# Patient Record
Sex: MALE | Race: WHITE
[De-identification: names, ages, dates, MRNs, and addresses within clinical notes are randomized per-mention and may not be internally consistent; named-entity substitution may affect disease eponyms.]

---

## 2018-10-15 ENCOUNTER — HOSPITAL ENCOUNTER (OUTPATIENT)
Dept: HOSPITAL 75 - CARD | Age: 68
End: 2018-10-15
Attending: NURSE PRACTITIONER
Payer: MEDICARE

## 2018-10-15 DIAGNOSIS — I08.1: ICD-10-CM

## 2018-10-15 DIAGNOSIS — I25.10: Primary | ICD-10-CM

## 2018-10-15 PROCEDURE — 93306 TTE W/DOPPLER COMPLETE: CPT

## 2018-10-19 ENCOUNTER — HOSPITAL ENCOUNTER (OUTPATIENT)
Dept: HOSPITAL 75 - CARD | Age: 68
End: 2018-10-19
Attending: NURSE PRACTITIONER
Payer: MEDICARE

## 2018-10-19 VITALS — DIASTOLIC BLOOD PRESSURE: 81 MMHG | SYSTOLIC BLOOD PRESSURE: 168 MMHG

## 2018-10-19 DIAGNOSIS — I25.10: Primary | ICD-10-CM

## 2018-10-19 PROCEDURE — 93017 CV STRESS TEST TRACING ONLY: CPT

## 2018-10-19 PROCEDURE — 78452 HT MUSCLE IMAGE SPECT MULT: CPT

## 2018-10-20 NOTE — STRESS TEST
DATE OF SERVICE:  10/19/2018



RESTING AND POST EXERCISE TECHNETIUM-99M TETROFOSMIN SPECT CT IMAGING



ORDERING PHYSICIAN:

JN Garzon



PRIMARY CARE PHYSICIAN:

Dr. Dsouza.



OTHER PHYSICIAN:

Dr. Wise.



CLINICAL DIAGNOSES:

Coronary artery disease.



Baseline images were carried out after injection of 10.94 mCi of technetium-99m

Tetrofosmin.  This was followed by exercise on a treadmill.  Tej protocol was

employed.  He completed two stages and exercised for 2 minutes in the third

stage.  The total exercise time was 8 minutes.  Exercise was stopped on account

of fatigue.  He attained 90% of maximum predicted heart rate.  There did not

appear to be significant ST segment changes consistent with ischemia.  No

significant arrhythmia was seen during exercise.  Heart rate response to

exercise was normal.  Blood pressure response to exercise was somewhat

hypertensive.  In the recovery phase, a few isolated and one coupled premature

ventricular contractions were seen.  He received 32.8 mCi of technetium-99m

Tetrofosmin for stress imaging after he had attained more than 85% of maximum

predicted heart rate and the exercise was continued for more than another

minute.



Review of images at rest and following stress does not indicate any significant

perfusion defects consistent with significant myocardial ischemia or infarction.

 Gated images show normal global left ventricular systolic function.  Normal

regional wall motion.  Left ventricular ejection fraction is calculated to be

64%.  Left ventricular end diastolic volume is 68 mL.  TID is absent (1.04).



CONCLUSIONS:

1.  No evidence of significant myocardial ischemia or infarction on this study.

2.  Normal regional wall motion.

3.  Normal global left ventricular systolic function with a calculated ejection

fraction 64%.

4.  Normal left ventricular cavity size.





Job ID: [id number]

DocumentID: [id number]

Dictated Date:  10/19/2018 20:40:39

Transcription Date: 10/20/2018 00:19:38

Dictated By: CATIE WISE MD, MA, FACP, FACC,

## 2020-09-18 ENCOUNTER — HOSPITAL ENCOUNTER (OUTPATIENT)
Dept: HOSPITAL 75 - CARD | Age: 70
End: 2020-09-18
Attending: INTERNAL MEDICINE
Payer: MEDICARE

## 2020-09-18 DIAGNOSIS — Z53.8: ICD-10-CM

## 2020-09-18 DIAGNOSIS — I25.10: Primary | ICD-10-CM

## 2020-09-28 NOTE — HISTORY AND PHYSICAL
DATE OF SERVICE:  



COLONOSCOPY AND EGD HISTORY AND PHYSICAL



HISTORY OF PRESENT ILLNESS:

The patient is a 70-year-old white male, being set up for screening colonoscopy

and diagnostic EGD.  He has a history of reflux and reports that he has been

having breakthrough symptoms on pantoprazole in the morning and recent switch

due to contaminant issues from ranitidine to Pepcid 20 mg at bedtime.  Most of

his breakthrough symptoms are in the evening.  He reports no change in

occasional social alcohol intake and believes that his weight has been stable. 

He has a history of coronary artery disease several years out from stent

placement for which he is on aspirin and Plavix.  He has noted no bright red

blood per rectum or melena and denies dysphagia.



MEDICATIONS ON ADMISSION:

Metoprolol 20 mg q.a.m. daily, Norvasc 5 mg at bedtime, Plavix 75 mg daily,

aspirin 81 mg daily, pantoprazole 40 mg in the morning, Pepcid 20 mg in the

evening.  He also occasionally takes QVAR 2 puffs twice a day.



FAMILY HISTORY:

Father  at the age of 58 secondary to myocardial infarction.  Mother  at

the age of 69, result of a motor vehicle accident.  He has one sister living at

the age of 73 with history of nasopharyngeal carcinoma.



SOCIAL HISTORY:

He has roughly a 20-pack-year smoking history, but quit 11 years ago with

occasional social alcohol intake.



PHYSICAL EXAMINATION:

GENERAL:  Reveals a well-appearing white male in no acute distress.

VITAL SIGNS:  Weight 211.4 pounds, blood pressure 126/60.

HEENT:  Unremarkable except that he did have cerumen impaction in the left ear

only.  He does have presbycusis for which he wears a hearing aid.

NECK:  Revealed no JVD, adenopathy or bruits.

CHEST:  Clear.

CARDIOVASCULAR:  Regular rate and rhythm without murmur, S3 or S4.

ABDOMEN:  Soft, supple without mass, organomegaly or tenderness.

EXTREMITIES:  Reveal no cyanosis, clubbing or edema.

SKIN:  Evaluation revealed no suspicious nevi.



ASSESSMENT AND PLAN:

The patient was set up for screening colonoscopy and diagnostic EGD due to her

reflux symptoms refractory to proton pump inhibitor therapy.  Prep instructions

with the Suprep kit were given and questions were answered.  The patient is

being tentatively set up for 10/09/2020.  The patient will hold aspirin and

Plavix one week prior to the procedure.





Job ID: [id number]

DocumentID: [id number]

Dictated Date:  2020 16:29:42

Transcription Date: 2020 17:04:58

Dictated By: KATIA WONG MD

## 2020-10-06 ENCOUNTER — HOSPITAL ENCOUNTER (OUTPATIENT)
Dept: HOSPITAL 75 - PREOP | Age: 70
Discharge: HOME | End: 2020-10-06
Attending: INTERNAL MEDICINE
Payer: MEDICARE

## 2020-10-06 VITALS — WEIGHT: 211.42 LBS | HEIGHT: 70.87 IN | BODY MASS INDEX: 29.6 KG/M2

## 2020-10-06 DIAGNOSIS — Z20.828: ICD-10-CM

## 2020-10-06 DIAGNOSIS — Z01.812: Primary | ICD-10-CM

## 2020-10-06 PROCEDURE — 87635 SARS-COV-2 COVID-19 AMP PRB: CPT

## 2020-10-09 ENCOUNTER — HOSPITAL ENCOUNTER (OUTPATIENT)
Dept: HOSPITAL 75 - ENDO | Age: 70
Discharge: HOME | End: 2020-10-09
Attending: INTERNAL MEDICINE
Payer: MEDICARE

## 2020-10-09 VITALS — DIASTOLIC BLOOD PRESSURE: 61 MMHG | SYSTOLIC BLOOD PRESSURE: 110 MMHG

## 2020-10-09 VITALS — SYSTOLIC BLOOD PRESSURE: 107 MMHG | DIASTOLIC BLOOD PRESSURE: 57 MMHG

## 2020-10-09 VITALS — DIASTOLIC BLOOD PRESSURE: 77 MMHG | SYSTOLIC BLOOD PRESSURE: 133 MMHG

## 2020-10-09 VITALS — DIASTOLIC BLOOD PRESSURE: 83 MMHG | SYSTOLIC BLOOD PRESSURE: 128 MMHG

## 2020-10-09 VITALS — WEIGHT: 211.42 LBS | BODY MASS INDEX: 29.6 KG/M2 | HEIGHT: 70.87 IN

## 2020-10-09 DIAGNOSIS — J45.909: ICD-10-CM

## 2020-10-09 DIAGNOSIS — Z95.5: ICD-10-CM

## 2020-10-09 DIAGNOSIS — K63.5: ICD-10-CM

## 2020-10-09 DIAGNOSIS — I25.10: ICD-10-CM

## 2020-10-09 DIAGNOSIS — Z88.1: ICD-10-CM

## 2020-10-09 DIAGNOSIS — Z79.82: ICD-10-CM

## 2020-10-09 DIAGNOSIS — E78.5: ICD-10-CM

## 2020-10-09 DIAGNOSIS — M06.9: ICD-10-CM

## 2020-10-09 DIAGNOSIS — Z79.02: ICD-10-CM

## 2020-10-09 DIAGNOSIS — Z79.890: ICD-10-CM

## 2020-10-09 DIAGNOSIS — Z79.899: ICD-10-CM

## 2020-10-09 DIAGNOSIS — K21.9: ICD-10-CM

## 2020-10-09 DIAGNOSIS — K58.9: ICD-10-CM

## 2020-10-09 DIAGNOSIS — Z88.2: ICD-10-CM

## 2020-10-09 DIAGNOSIS — I10: ICD-10-CM

## 2020-10-09 DIAGNOSIS — Z87.891: ICD-10-CM

## 2020-10-09 DIAGNOSIS — Z12.11: Primary | ICD-10-CM

## 2020-10-09 PROCEDURE — 88305 TISSUE EXAM BY PATHOLOGIST: CPT

## 2020-10-09 NOTE — ANESTHESIA-GENERAL POST-OP
MAC


Patient Condition


Mental Status/LOC:  Same as Preop


Cardiovascular:  Satisfactory


Nausea/Vomiting:  Absent


Respiratory:  Satisfactory


Pain:  Controlled


Complications:  Absent





Post Op Complications


Complications


None





Follow Up Care/Instructions


Patient Instructions


None needed.





Anesthesiology Discharge Order


Discharge Order


Patient was seen after the procedure and he was doing well, no complaints, 

stable vital signs, no apparent adverse anesthesia problems.











SUSHILA SUAZO DO          Oct 9, 2020 09:55

## 2020-10-09 NOTE — OPERATIVE REPORT
DATE OF SERVICE:  10/09/2020



PANENDOSCOPY SUMMARY



PRIMARY CARE PHYSICIAN:

Katia Wong MD



INDICATION FOR THE PROCEDURE:

Screening colonoscopy and esophagogastroduodenoscopy evaluation for refractory

gastroesophageal reflux disease.



DESCRIPTION OF PROCEDURE:

The patient was placed in the left lateral decubitus position.  Procedure was

under Diprivan based anesthesia.  The upper endoscopy was performed first.  The

endoscope was inserted into the oral cavity and under direct visualization, the

esophagus was intubated.  Endoscope was passed down the esophagus, into the 
stomach and

second portion of the duodenum.  Careful inspection was made as the endoscope

was withdrawn.  The patient tolerated the procedure well.



FINDINGS:

The posterior pharynx, arytenoid aperture, true and false vocal folds and

epiglottis were unremarkable to visual inspection.  The proximal, mid and distal

esophagus were unremarkable to gross inspection as well.  The Z line was

distinct.  There was evidence for lower esophageal sphincter laxity with a

questionable small hiatal hernia.  The cardia, fundus, antrum, pylorus, pyloric

channel, duodenal bulb and second portion of duodenum were unremarkable with no

evidence for inflammation or ulceration

EGD findings compatible with lower esophageal sphincter laxity, but no evidence

for esophagitis.  I just discussed Gaviscon for breakthrough reflux symptoms,

continuing proton pump inhibitor therapy in the morning and famotidine in the

evening.  We then proceeded with colonoscopy.



DESCRIPTION OF PROCEDURE:

Prior to undergoing colonoscopy, digital rectal evaluation was performed.  Anal

sphincter tone was normal and the perianal reflex was intact.  The prostate

is not enlarged and anodular on digital inspection.  No abnormalities were noted

on digital inspection of the anal canal or distal rectum.  The colonoscope was

then inserted into the rectum and under direct visualization advanced to cecum. 

The cecum was identified by identification of the ileocecal valve and cecal

strap.  Photographic documentation was obtained.  The Quality of prep was good.



FINDINGS:

There was no evidence for internal or external hemorrhoids and the rectum was

unremarkable.  Present in the distal sigmoid colon was a diminutive

hyperplastic-appearing polyp.  It was biopsied and ablated and submitted for

histopathology.  The remainder of the sigmoid colon was unremarkable with no

evidence for diverticular disease.  The descending colon, splenic flexure,

transverse colon, hepatic flexure, ascending colon and cecum were unremarkable.



ASSESSMENT AND PLAN:

One diminutive hyperplastic appearing polyp was removed from the distal sigmoid

colon with otherwise unremarkable colonoscopy to the cecum.  As long as there is

no surprise on histopathology evaluation, we will be abdicating consideration 
for

repeat screening colonoscopy in 10 years.





Job ID: [id number]

DocumentID: [id number]

Dictated Date:  10/09/2020 11:38:10

Transcription Date: 10/09/2020 21:33:06

Dictated By: KATIA WONG MD

MTDD

## 2020-10-09 NOTE — PRE-OP NOTE & CONSCIOUS SEDAT
Pre-Operative Progress Note


H&P Reviewed


The H&P was reviewed, patient examined and no changes noted.


Date H&P Reviewed:  Oct 9, 2020


Time H&P Reviewed:  07:40





Conscious Sedation Pre-Proced


ASA Score


2


For ASA 3 and 4: Consider anesthesia and medical clearance. Also, for patients

with a history of failed moderate sedation consider anesthesia.

















Airway 


 


Lungs 


 


Heart 


 


 ASA score


 


 ASA 1: a normal healthy patient


 


 ASA 2:  a patient with a mild systemic disease (mid diabetes, controlled 

hypertension, obesity 


 


 ASA 3:  a patient with a severe systemic disease that limits activity  (angina,

COPD, prior Myocardial infarction)


 


 ASA 4:  a patient with an incapacitating disease that is a constant threat to 

life (CHF, renal failure)


 


 ASA 5:  a moribund patient not expected to survive 24 hrs.  (ruptured aneurysm)


 


 ASA 6:  a declared brain-dead patient whose organs are being harvested.


 


 For emergent operations, add the letter E after the classification











Mallampati Classification


Grade 2





Sedation Plan


Analgesia, Amnesia, Plan communicated to team members, Discussed options with 

patient/fam, Discussed risks with patient/fam


The patient is an appropriate candidate to undergo the planned procedure, 

sedation, and anesthesia.





The patient immediately re-assessed prior to indication.











KATIA WONG MD               Oct 9, 2020 08:35

## 2020-10-22 ENCOUNTER — HOSPITAL ENCOUNTER (OUTPATIENT)
Dept: HOSPITAL 75 - CARD | Age: 70
End: 2020-10-22
Attending: NURSE PRACTITIONER
Payer: MEDICARE

## 2020-10-22 DIAGNOSIS — I25.10: Primary | ICD-10-CM

## 2020-10-22 PROCEDURE — 93306 TTE W/DOPPLER COMPLETE: CPT

## 2021-10-18 ENCOUNTER — HOSPITAL ENCOUNTER (OUTPATIENT)
Dept: HOSPITAL 75 - RAD | Age: 71
End: 2021-10-18
Attending: UROLOGY
Payer: MEDICARE

## 2021-10-18 DIAGNOSIS — M51.36: ICD-10-CM

## 2021-10-18 DIAGNOSIS — M47.813: Primary | ICD-10-CM

## 2021-10-18 DIAGNOSIS — N28.89: ICD-10-CM

## 2021-10-18 PROCEDURE — 72100 X-RAY EXAM L-S SPINE 2/3 VWS: CPT

## 2021-10-18 PROCEDURE — 72040 X-RAY EXAM NECK SPINE 2-3 VW: CPT

## 2021-10-18 PROCEDURE — 72072 X-RAY EXAM THORAC SPINE 3VWS: CPT

## 2021-10-18 NOTE — DIAGNOSTIC IMAGING REPORT
HISTORY: Chronic back pain



TECHNIQUE: 2 views of the thoracic spine. 3rd swimmer's view of

the thoracic spine is included on the cervical spine radiographs



COMPARISON: None



FINDINGS:



There is slight left convex curvature of the thoracic spine

centered at T11. No spondylolisthesis is seen. There are moderate

degenerative changes throughout the thoracic spine. No acute

fracture is seen. Vertebral body heights are generally preserved.

Cholecystectomy clips are noted.



IMPRESSION:. Moderate degenerative changes in the thoracic spine

with no acute fracture seen.



Dictated by: 



  Dictated on workstation # [id number]

## 2021-10-18 NOTE — DIAGNOSTIC IMAGING REPORT
EXAMINATION: Lumbar spine at 9:13 AM



INDICATION: Thoracolumbar pain



3 views were obtained. There are no prior studies available for

comparison.



The lateral view shows the vertebral body heights and alignment

to be generally within normal limits. There is mild narrowing of

the disc spaces at L1-2, L2-3 and L4-L5. There are also prominent

bridging osteophytes along the ventral aspects of the

thoracolumbar junction extending from T9 to L2. There is no

fracture or acute bony abnormality appreciated.



There is no sign of a paraspinal mass. There is a 5.7 mm

calcification overlying the right kidney. I suspect this is

intrarenal. If further study is desired, then ultrasound would be

recommended.



There is mild symmetrical sclerosis of the sacroiliac joints.



IMPRESSION:

1. There is no evidence for an acute bony abnormality.

2. There is degenerative disc and bony disease involving the

thoracolumbar junction as described above.

3. Probable right nephrolithiasis.

4. These results were discussed with Dr. Tawil.



Dictated by: 



  Dictated on workstation # [id number]

## 2021-10-18 NOTE — DIAGNOSTIC IMAGING REPORT
HISTORY: Chronic neck pain



TECHNIQUE: 4 views of the cervical spine



COMPARISON: 08/05/2010



FINDINGS:



No acute fracture is seen in the cervical spine. Alignment

appears normal. There are severe degenerative changes at C5-C6

and C6-C7 and mild degenerative changes elsewhere in the cervical

spine. C1-C2 alignment appears normal. Vertebral body heights are

preserved. Prevertebral soft tissues appear normal.



IMPRESSION:

1. Severe degenerative changes at C5-C6 and C6-C7 with no acute

osseous abnormalities seen in the cervical spine.



Dictated by: 



  Dictated on workstation # [id number]

## 2022-02-17 ENCOUNTER — HOSPITAL ENCOUNTER (OUTPATIENT)
Dept: HOSPITAL 75 - LABNPT | Age: 72
End: 2022-02-17
Attending: UROLOGY
Payer: MEDICARE

## 2022-02-17 DIAGNOSIS — Z20.822: Primary | ICD-10-CM

## 2022-02-17 PROCEDURE — 87636 SARSCOV2 & INF A&B AMP PRB: CPT

## 2022-11-03 ENCOUNTER — HOSPITAL ENCOUNTER (OUTPATIENT)
Dept: HOSPITAL 75 - CARD | Age: 72
End: 2022-11-03
Attending: NURSE PRACTITIONER
Payer: MEDICARE

## 2022-11-03 VITALS — SYSTOLIC BLOOD PRESSURE: 139 MMHG | DIASTOLIC BLOOD PRESSURE: 76 MMHG

## 2022-11-03 DIAGNOSIS — I25.10: Primary | ICD-10-CM

## 2022-11-03 PROCEDURE — 93017 CV STRESS TEST TRACING ONLY: CPT

## 2022-11-03 PROCEDURE — 78452 HT MUSCLE IMAGE SPECT MULT: CPT

## 2022-11-03 NOTE — STRESS TEST
DATE OF SERVICE: 11/03/2022



RESTING AND POST REGADENOSON TECHNETIUM-99M TETROFOSMIN SPECT CT IMAGING



CLINICAL DIAGNOSIS:

Coronary artery disease.



ORDERING PHYSICIAN:

Dr. Wise.



PRIMARY PHYSICIAN:

Dr. Dsouza.



Baseline images were carried out after injection of 10.95 mCi of technetium-99m 

Tetrofosmin.  This was followed by 0.4 mg regadenoson and 30.3 mCi of technetium

99m tetrofosmin for stress imaging.  The electrocardiogram showed sinus rhythm 

at baseline.  It did not change significantly with the regadenoson infusion.  He

tolerated the procedure well.  Review of images at rest and following stress 

does not indicate any significant perfusion defect consistent with significant 

myocardial ischemia or infarction.  Gated images showed normal global left 

ventricular systolic function with normal regional wall motion.  Left 

ventricular ejection fraction was calculated to be 60%.



CONCLUSIONS:



1.  No evidence of any significant myocardial ischemia or infarction.

2.  Normal regional wall motion, normal global left ventricular systolic 

function with a calculated ejection fraction of 60%.





Job ID: [id number]

DocumentID: [id number]

Dictated Date: 11/03/2022 17:15:18

Transcription Date: 11/03/2022 19:33:00

Dictated By: CATIE WISE MD; MA; FACP; FACC;